# Patient Record
(demographics unavailable — no encounter records)

---

## 2017-07-06 NOTE — PD
HPI


Chief Complaint:  ENT Complaint


Time Seen by Provider:  10:12


Travel History


International Travel<30 days:  No


Contact w/Intl Traveler<30days:  No


Traveled to known affect area:  No





History of Present Illness


HPI


The patient is a 20-year-old  female who presents emergency department 

for sore throat of approximately one week's duration.  The patient is a one-

week history of sore throat, now notes the posterior aspect of her throat is 

red with some white spots visible bilaterally.  She also complains of some 

anterior cervical lymphadenopathy which is tender to palpation.  The patient 

denies any postnasal drip, nasal congestion, runny nose, ear pain, fever, chills

, or sweats.  She denies any associated cough, body aches, or rash.  Symptoms 

are mild to moderate, have been constant for one week, and there are no current 

alleviating or exacerbating factors.  She denies any history of mononucleosis.





PFSH


Past Medical History


Developmental Delay:  No


Diminished Hearing:  No


Neurologic:  Yes (pseudo Cerebri tumor)


Immunizations Current:  Yes


Pregnant?:  Not Pregnant





Social History


Alcohol Use:  No


Tobacco Use:  No


Substance Use:  No





Allergies-Medications


(Allergen,Severity, Reaction):  


Coded Allergies:  


     No Known Allergies (Unverified , 7/6/17)


Reported Meds & Prescriptions





Reported Meds & Active Scripts


Active


No Active Prescriptions or Reported Medications    








Review of Systems


General / Constitutional:  No: Fever


HENT:  Positive: Sore Throat, Neck Pain,  No: Rhinitis, Rhinorrhea, Congestion


Respiratory:  No: Cough


Gastrointestinal:  No: Nausea, Vomiting


Musculoskeletal:  No: Myalgias, Arthralgias


Skin:  No Rash





Physical Exam


Narrative


GENERAL: Awake, alert, very pleasant 20-year-old female who appears her stated 

age and is in no acute respiratory distress.


SKIN: Focused skin assessment warm/dry.


HEAD: Atraumatic. Normocephalic. 


EYES: Pupils equal and round. No scleral icterus. No injection or drainage. 


ENT: Oropharynx reveals erythema with white exudate bilaterally.  TMs are 

translucent and EACs are clear.  Old appearing scarring on the right tympanic 

membrane.


NECK: Trachea midline. No JVD.  Bilateral anterior cervical lymphadenopathy 

which is mobile and tender.


MUSCULOSKELETAL: No obvious deformities. No clubbing.  No cyanosis.  No edema. 


NEUROLOGICAL: Awake and alert. No obvious cranial nerve deficits.  Motor 

grossly within normal limits. Normal speech.


PSYCHIATRIC: Appropriate mood and affect; insight and judgment normal.





Data


Data


Last Documented VS





Vital Signs








  Date Time  Temp Pulse Resp B/P Pulse Ox O2 Delivery O2 Flow Rate FiO2


 


7/6/17 10:07 98.2 84 16 150/89 97   








Orders





 Group A Rapid Strep Screen (7/6/17 10:17)


Strep Culture (Group A) (7/6/17 10:20)








MDM


Medical Decision Making


Medical Screen Exam Complete:  Yes


Emergency Medical Condition:  Yes


Medical Record Reviewed:  Yes


Interpretation(s)











 Date/Time Procedure Status





Source Growth 


 


 7/6/17 10:20 Group A Streptococcus Screen ([facility]) - Final Complete





Throat  


 


 7/6/17 10:20 Group A Streptococcus Screen Received





Throat Pending 








Differential Diagnosis


Differential diagnosis includes strep pharyngitis, viral pharyngitis, dental 

virus, mononucleosis, diphtheria, gonorrhea.


Narrative Course


A strep screen was sent to lab.  Rapid group A strep screen was negative.  The 

patient's symptoms most likely are viral, treatment is symptomatic with Tylenol 

and/or Motrin.  She is advised to follow-up with a primary physician.  Strep 

culture has been sent to lab.  She will be notified if the strep culture 

becomes positive.  Patient is stable for outpatient follow-up.





Diagnosis





 Primary Impression:  


 Exudative pharyngitis


Patient Instructions:  General Instructions





***Additional Instructions:


Please provide the patient a copy of her strep results at discharge.  Tylenol 

and/or Motrin as needed for symptoms.  Follow-up with her primary physician.  

Return if symptoms worsen or progress.


Scripts


No Active Prescriptions or Reported Meds


Disposition:  01 DISCHARGE HOME


Condition:  Stable








Galdino Knowles MD Jul 6, 2017 10:22